# Patient Record
Sex: MALE | Race: WHITE | Employment: FULL TIME | URBAN - METROPOLITAN AREA
[De-identification: names, ages, dates, MRNs, and addresses within clinical notes are randomized per-mention and may not be internally consistent; named-entity substitution may affect disease eponyms.]

---

## 2022-03-17 ENCOUNTER — OFFICE VISIT (OUTPATIENT)
Dept: INTERNAL MEDICINE CLINIC | Age: 37
End: 2022-03-17
Payer: COMMERCIAL

## 2022-03-17 VITALS
HEIGHT: 72 IN | RESPIRATION RATE: 18 BRPM | TEMPERATURE: 98.2 F | HEART RATE: 71 BPM | WEIGHT: 165.4 LBS | OXYGEN SATURATION: 96 % | BODY MASS INDEX: 22.4 KG/M2 | DIASTOLIC BLOOD PRESSURE: 67 MMHG | SYSTOLIC BLOOD PRESSURE: 106 MMHG

## 2022-03-17 DIAGNOSIS — Z00.00 PHYSICAL EXAM: Primary | ICD-10-CM

## 2022-03-17 DIAGNOSIS — G44.89 OTHER HEADACHE SYNDROME: ICD-10-CM

## 2022-03-17 DIAGNOSIS — K63.89 SMALL INTESTINAL BACTERIAL OVERGROWTH: ICD-10-CM

## 2022-03-17 PROCEDURE — 99385 PREV VISIT NEW AGE 18-39: CPT | Performed by: INTERNAL MEDICINE

## 2022-03-17 PROCEDURE — 99213 OFFICE O/P EST LOW 20 MIN: CPT | Performed by: INTERNAL MEDICINE

## 2022-03-17 NOTE — PROGRESS NOTES
Chief Complaint   Patient presents with    Complete Physical    Establish Care     gut issue: discomfort & bad gas since taking antibiotics     1. Have you been to the ER, urgent care clinic since your last visit? Hospitalized since your last visit? No    2. Have you seen or consulted any other health care providers outside of the [address] [place] [address] since your last visit? Include any pap smears or colon screening.  No  Visit Vitals  /67   Pulse 71   Temp 98.2 °F (36.8 °C) (Oral)   Resp 18   Ht 6' (1.829 m)   Wt 165 lb 6.4 oz (75 kg)   SpO2 96%   BMI 22.43 kg/m²

## 2022-03-18 LAB
ALBUMIN SERPL-MCNC: 4.5 G/DL (ref 4–5)
ALBUMIN/GLOB SERPL: 2.5 {RATIO} (ref 1.2–2.2)
ALP SERPL-CCNC: 63 IU/L (ref 44–121)
ALT SERPL-CCNC: 28 IU/L (ref 0–44)
APPEARANCE UR: CLEAR
AST SERPL-CCNC: 29 IU/L (ref 0–40)
BASOPHILS # BLD AUTO: 0 X10E3/UL (ref 0–0.2)
BASOPHILS NFR BLD AUTO: 1 %
BILIRUB SERPL-MCNC: 0.3 MG/DL (ref 0–1.2)
BILIRUB UR QL STRIP: NEGATIVE
BUN SERPL-MCNC: 19 MG/DL (ref 6–20)
BUN/CREAT SERPL: 18 (ref 9–20)
CALCIUM SERPL-MCNC: 9.2 MG/DL (ref 8.7–10.2)
CHLORIDE SERPL-SCNC: 102 MMOL/L (ref 96–106)
CHOLEST SERPL-MCNC: 214 MG/DL (ref 100–199)
CO2 SERPL-SCNC: 26 MMOL/L (ref 20–29)
COLOR UR: YELLOW
CREAT SERPL-MCNC: 1.03 MG/DL (ref 0.76–1.27)
EGFR: 97 ML/MIN/1.73
EOSINOPHIL # BLD AUTO: 0.3 X10E3/UL (ref 0–0.4)
EOSINOPHIL NFR BLD AUTO: 3 %
ERYTHROCYTE [DISTWIDTH] IN BLOOD BY AUTOMATED COUNT: 13.2 % (ref 11.6–15.4)
GLOBULIN SER CALC-MCNC: 1.8 G/DL (ref 1.5–4.5)
GLUCOSE SERPL-MCNC: 89 MG/DL (ref 65–99)
GLUCOSE UR QL STRIP: NEGATIVE
HCT VFR BLD AUTO: 43.7 % (ref 37.5–51)
HDLC SERPL-MCNC: 72 MG/DL
HGB BLD-MCNC: 14.7 G/DL (ref 13–17.7)
HGB UR QL STRIP: NEGATIVE
IMM GRANULOCYTES # BLD AUTO: 0 X10E3/UL (ref 0–0.1)
IMM GRANULOCYTES NFR BLD AUTO: 1 %
KETONES UR QL STRIP: NEGATIVE
LDLC SERPL CALC-MCNC: 121 MG/DL (ref 0–99)
LEUKOCYTE ESTERASE UR QL STRIP: NEGATIVE
LYMPHOCYTES # BLD AUTO: 2.7 X10E3/UL (ref 0.7–3.1)
LYMPHOCYTES NFR BLD AUTO: 31 %
MCH RBC QN AUTO: 29.9 PG (ref 26.6–33)
MCHC RBC AUTO-ENTMCNC: 33.6 G/DL (ref 31.5–35.7)
MCV RBC AUTO: 89 FL (ref 79–97)
MICRO URNS: NORMAL
MONOCYTES # BLD AUTO: 0.7 X10E3/UL (ref 0.1–0.9)
MONOCYTES NFR BLD AUTO: 8 %
NEUTROPHILS # BLD AUTO: 4.9 X10E3/UL (ref 1.4–7)
NEUTROPHILS NFR BLD AUTO: 56 %
NITRITE UR QL STRIP: NEGATIVE
PH UR STRIP: 7.5 [PH] (ref 5–7.5)
PLATELET # BLD AUTO: 192 X10E3/UL (ref 150–450)
POTASSIUM SERPL-SCNC: 4.7 MMOL/L (ref 3.5–5.2)
PROT SERPL-MCNC: 6.3 G/DL (ref 6–8.5)
PROT UR QL STRIP: NEGATIVE
RBC # BLD AUTO: 4.92 X10E6/UL (ref 4.14–5.8)
SODIUM SERPL-SCNC: 141 MMOL/L (ref 134–144)
SP GR UR STRIP: 1.01 (ref 1–1.03)
TRIGL SERPL-MCNC: 123 MG/DL (ref 0–149)
UROBILINOGEN UR STRIP-MCNC: 0.2 MG/DL (ref 0.2–1)
VLDLC SERPL CALC-MCNC: 21 MG/DL (ref 5–40)
WBC # BLD AUTO: 8.7 X10E3/UL (ref 3.4–10.8)

## 2022-03-18 NOTE — PROGRESS NOTES
[address] [facility]  [place] [address]  [address] [address]  Phone:  [phone]  Fax: [phone]       Chief Complaint   Patient presents with    Complete Physical    Establish Care     gut issue: discomfort & bad gas since taking antibiotics   . SUBJECTIVE:    Mahesh Bazzi is a 40 y.o. male comes in for physical examination. 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 He has a history of small bowel bacterial overgrowth. He is treated on two occasions with antibiotics which was successful transiently specifically 24 to 48 hours and then his symptoms returned. The predominant symptoms are the passage of soft, loose stools but most disheartening for him is significant flatus which quite foul. This is the thing that he would like to alleviate more than anything else. He was formerly living in [place] and saw a gastroenterologist there but I am not entirely sure adequate followup occurred at that location. He also has occasional headaches. Finally, he admits that he is in a moderate amount of stress. History reviewed. No pertinent past medical history.   Past Surgical History:   Procedure Laterality Date    HX ORTHOPAEDIC      HX WISDOM TEETH EXTRACTION       No Known Allergies      REVIEW OF SYSTEMS:  General: negative for - chills or fever  ENT: negative for - headaches, nasal congestion or tinnitus  Respiratory: negative for - cough, hemoptysis, shortness of breath or wheezing  Cardiovascular : negative for - chest pain, edema, palpitations or shortness of breath  Gastrointestinal: negative for - abdominal pain, blood in stools, heartburn or nausea/vomiting  Genito-Urinary: no dysuria, trouble voiding, or hematuria  Musculoskeletal: negative for - gait disturbance, joint pain, joint stiffness or joint swelling  Neurological: no TIA or stroke symptoms  Hematologic: no bruises, no bleeding, no swollen glands  Integument: no lumps, mole changes, nail changes or rash  Endocrine: no malaise/lethargy or unexpected weight changes      Social History     Socioeconomic History    Marital status:     Number of children: 2    Years of education: 21    Highest education level: Doctorate   Occupational History    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   Tobacco Use    Smoking status: Never Smoker    Smokeless tobacco: Never Used   Vaping Use    Vaping Use: Never used   Substance and Sexual Activity    Alcohol use: Never    Drug use: Never    Sexual activity: Yes     Partners: Female   Social History Narrative    Shanonrad Gladis Lesch    PhD [employer]     Family History   Problem Relation Age of Onset    Diabetes Father        OBJECTIVE:    Visit Vitals  /67   Pulse 71   Temp 98.2 °F (36.8 °C) (Oral)   Resp 18   Ht 6' (1.829 m)   Wt 165 lb 6.4 oz (75 kg)   SpO2 96%   BMI 22.43 kg/m²     CONSTITUTIONAL: well , well nourished, appears age appropriate  EYES: perrla, eom intact  ENMT:moist mucous membranes, pharynx clear  NECK: supple. Thyroid normal  RESPIRATORY: Chest: clear to ascultation and percussion   CARDIOVASCULAR: Heart: regular rate and rhythm  GASTROINTESTINAL: Abdomen: soft, bowel sounds active  HEMATOLOGIC: no pathological lymph nodes palpated  MUSCULOSKELETAL: Extremities: no edema, pulse 1+   INTEGUMENT: No unusual rashes or suspicious skin lesions noted. Nails appear normal.  NEUROLOGIC: non-focal exam   MENTAL STATUS: alert and oriented, appropriate affect      ASSESSMENT:  1. Physical exam    2. Small intestinal bacterial overgrowth    3. Other headache syndrome        PLAN:  1. The patient did see a gastroenterologist.  I think the principal problem that exists is a lack of adequate followup to determine what treatment is available for him.   Additionally, I think this is a rare condition and a lot of the gastroenterologists may not be familiar with it to the point where reasonable treatment regimens can be tried above and beyond the obvious ones. In any event, he needs to see a gastroenterologist.  There is very little I can offer him at this point. 2. He has headaches and these are predominantly stress related. Symptomatic treatment only. 3. He tells me that he will only be in the area until June at which time he has to make a decision about where he is going to go as far as his teaching is concerned. Orders Placed This Encounter    CBC WITH AUTOMATED DIFF    LIPID PANEL    METABOLIC PANEL, COMPREHENSIVE    URINALYSIS W/ RFLX MICROSCOPIC    REFERRAL TO GASTROENTEROLOGY         Follow-up and Dispositions    · Return in about 2 months (around 5/17/2022).            Cydney Villalba MD

## 2022-04-16 PROBLEM — Z00.00 PHYSICAL EXAM: Status: RESOLVED | Noted: 2022-03-17 | Resolved: 2022-04-16
